# Patient Record
Sex: FEMALE | Race: OTHER | NOT HISPANIC OR LATINO | URBAN - METROPOLITAN AREA
[De-identification: names, ages, dates, MRNs, and addresses within clinical notes are randomized per-mention and may not be internally consistent; named-entity substitution may affect disease eponyms.]

---

## 2020-01-10 ENCOUNTER — EMERGENCY (EMERGENCY)
Facility: HOSPITAL | Age: 35
LOS: 1 days | Discharge: HOME | End: 2020-01-10
Attending: EMERGENCY MEDICINE | Admitting: EMERGENCY MEDICINE
Payer: MEDICAID

## 2020-01-10 VITALS
SYSTOLIC BLOOD PRESSURE: 154 MMHG | TEMPERATURE: 99 F | RESPIRATION RATE: 16 BRPM | HEART RATE: 117 BPM | DIASTOLIC BLOOD PRESSURE: 83 MMHG | OXYGEN SATURATION: 100 %

## 2020-01-10 VITALS — OXYGEN SATURATION: 100 % | HEART RATE: 95 BPM | RESPIRATION RATE: 17 BRPM

## 2020-01-10 LAB
ALBUMIN SERPL ELPH-MCNC: 4.8 G/DL — SIGNIFICANT CHANGE UP (ref 3.3–5)
ALP SERPL-CCNC: 65 U/L — SIGNIFICANT CHANGE UP (ref 40–120)
ALT FLD-CCNC: 20 U/L — SIGNIFICANT CHANGE UP (ref 4–33)
ANION GAP SERPL CALC-SCNC: 14 MMO/L — SIGNIFICANT CHANGE UP (ref 7–14)
AST SERPL-CCNC: 37 U/L — HIGH (ref 4–32)
BASOPHILS # BLD AUTO: 0.04 K/UL — SIGNIFICANT CHANGE UP (ref 0–0.2)
BASOPHILS NFR BLD AUTO: 0.4 % — SIGNIFICANT CHANGE UP (ref 0–2)
BILIRUB SERPL-MCNC: 0.4 MG/DL — SIGNIFICANT CHANGE UP (ref 0.2–1.2)
BUN SERPL-MCNC: 11 MG/DL — SIGNIFICANT CHANGE UP (ref 7–23)
CALCIUM SERPL-MCNC: 9.5 MG/DL — SIGNIFICANT CHANGE UP (ref 8.4–10.5)
CHLORIDE SERPL-SCNC: 102 MMOL/L — SIGNIFICANT CHANGE UP (ref 98–107)
CO2 SERPL-SCNC: 18 MMOL/L — LOW (ref 22–31)
CREAT SERPL-MCNC: 0.71 MG/DL — SIGNIFICANT CHANGE UP (ref 0.5–1.3)
EOSINOPHIL # BLD AUTO: 0.08 K/UL — SIGNIFICANT CHANGE UP (ref 0–0.5)
EOSINOPHIL NFR BLD AUTO: 0.9 % — SIGNIFICANT CHANGE UP (ref 0–6)
GLUCOSE SERPL-MCNC: 107 MG/DL — HIGH (ref 70–99)
HCT VFR BLD CALC: 43.8 % — SIGNIFICANT CHANGE UP (ref 34.5–45)
HGB BLD-MCNC: 14.5 G/DL — SIGNIFICANT CHANGE UP (ref 11.5–15.5)
IMM GRANULOCYTES NFR BLD AUTO: 0.2 % — SIGNIFICANT CHANGE UP (ref 0–1.5)
LYMPHOCYTES # BLD AUTO: 2.52 K/UL — SIGNIFICANT CHANGE UP (ref 1–3.3)
LYMPHOCYTES # BLD AUTO: 28.1 % — SIGNIFICANT CHANGE UP (ref 13–44)
MCHC RBC-ENTMCNC: 28.3 PG — SIGNIFICANT CHANGE UP (ref 27–34)
MCHC RBC-ENTMCNC: 33.1 % — SIGNIFICANT CHANGE UP (ref 32–36)
MCV RBC AUTO: 85.4 FL — SIGNIFICANT CHANGE UP (ref 80–100)
MONOCYTES # BLD AUTO: 0.4 K/UL — SIGNIFICANT CHANGE UP (ref 0–0.9)
MONOCYTES NFR BLD AUTO: 4.5 % — SIGNIFICANT CHANGE UP (ref 2–14)
NEUTROPHILS # BLD AUTO: 5.91 K/UL — SIGNIFICANT CHANGE UP (ref 1.8–7.4)
NEUTROPHILS NFR BLD AUTO: 65.9 % — SIGNIFICANT CHANGE UP (ref 43–77)
NRBC # FLD: 0 K/UL — SIGNIFICANT CHANGE UP (ref 0–0)
PLATELET # BLD AUTO: 297 K/UL — SIGNIFICANT CHANGE UP (ref 150–400)
PMV BLD: 9.2 FL — SIGNIFICANT CHANGE UP (ref 7–13)
POTASSIUM SERPL-MCNC: 5 MMOL/L — SIGNIFICANT CHANGE UP (ref 3.5–5.3)
POTASSIUM SERPL-SCNC: 5 MMOL/L — SIGNIFICANT CHANGE UP (ref 3.5–5.3)
PROT SERPL-MCNC: 7.8 G/DL — SIGNIFICANT CHANGE UP (ref 6–8.3)
RBC # BLD: 5.13 M/UL — SIGNIFICANT CHANGE UP (ref 3.8–5.2)
RBC # FLD: 12.1 % — SIGNIFICANT CHANGE UP (ref 10.3–14.5)
SODIUM SERPL-SCNC: 134 MMOL/L — LOW (ref 135–145)
WBC # BLD: 8.97 K/UL — SIGNIFICANT CHANGE UP (ref 3.8–10.5)
WBC # FLD AUTO: 8.97 K/UL — SIGNIFICANT CHANGE UP (ref 3.8–10.5)

## 2020-01-10 PROCEDURE — 70496 CT ANGIOGRAPHY HEAD: CPT | Mod: 26

## 2020-01-10 PROCEDURE — 93010 ELECTROCARDIOGRAM REPORT: CPT

## 2020-01-10 PROCEDURE — 70498 CT ANGIOGRAPHY NECK: CPT | Mod: 26

## 2020-01-10 PROCEDURE — 99285 EMERGENCY DEPT VISIT HI MDM: CPT | Mod: 25

## 2020-01-10 NOTE — ED ADULT NURSE NOTE - OBJECTIVE STATEMENT
Intake RN: Patient is a 35 y/o F a&ox4, denies PMH, p/w a c/c of acute onset blurry vision in one eye that lasted approximately one minute, followed by an transient episode of dizziness.  At present patient asymptomatic, denying complaints.  Patient denies SOB, CP, N/V/D, F/C, abd pain, GI/ symptoms.  Patient in nad, waiting to be seen by MD.

## 2020-01-10 NOTE — CONSULT NOTE ADULT - ASSESSMENT
Assessment: 34 year old RH F with no significant PMH presents after a one minute episode of cloudy vision in the left eye on 1/10. Vital signs significant for a BP of 154/83 and a HR of 117. Physical exam showed no focal neurologic deficits, but tachycardia.     Impression: DELANEY possible, floaters possible, ocular migraine possible. Considering monocular symptoms, neurologic etiology appears less likely    Plan:  Consider obtaining CTA H/N with contrast. If no acute findings, then no neurologic contraindication to discharge  Neurology follow up outpatient ([address] [place] [address], [phone])    Case discussed with neurology attending, Dr. Gaston. Assessment: 34 year old RH F with no significant PMH presents after a one minute episode of cloudy vision in the left eye on 1/10. Vital signs significant for a BP of 154/83 and a HR of 117. Physical exam showed no focal neurologic deficits, but tachycardia.     Impression: DELANEY possible, floaters possible, ocular migraine possible. Considering monocular symptoms, neurologic etiology appears less likely    Plan:  Consider obtaining CTA H/N with contrast. If no acute findings, then no neurologic contraindication to discharge  Workup of tachycardia/HTN as per primary team.  Neurology follow up outpatient ([address] [place] [address], [phone])    Case discussed with neurology attending, Dr. Gaston.

## 2020-01-10 NOTE — ED PROVIDER NOTE - PROGRESS NOTE DETAILS
spoke with optho resident who will see pt in the ED FELIPE Srinivasan: seen by optho, normal eye exam, neuro evaluation pt at present FELIPE Srinivasan: seen by neuro, agree with CTa head and neck FELIPE Srinivasan: CTa head and neck normal, discussed with pt. She will f/u neuro and optho outpt

## 2020-01-10 NOTE — ED PROVIDER NOTE - CLINICAL SUMMARY MEDICAL DECISION MAKING FREE TEXT BOX
34yF w/no pmhx p/w left eye visual changes (blurry vision) x 1-2 minutes this morning, now resolved with associated lightheadedness. non focal neuro exam. Vision 20/20. Will consult optho for possible retinal detachment. 34yF w/no pmhx p/w left eye visual changes (blurry vision) x 1-2 minutes this morning, now resolved with associated lightheadedness. non focal neuro exam. Vision 20/20. Will consult optho for possible retinal detachment/retinal artery/vein pathology. Will discuss with neuro as history concerning for TIA. Will check cbc/cmp, ucg, CTa head and neck. Triage vitals with tachycardia, pt reports feeling very anxious. Will continue to reassess, no visual disturbance or symptoms at present.

## 2020-01-10 NOTE — ED PROVIDER NOTE - PATIENT PORTAL LINK FT
You can access the FollowMyHealth Patient Portal offered by [facility] by registering at the following website: [email or web address]. By joining [employer]’s FollowMyHealth portal, you will also be able to view your health information using other applications (apps) compatible with our system.

## 2020-01-10 NOTE — ED PROVIDER NOTE - ATTENDING CONTRIBUTION TO CARE
MD Davila:  I performed a face to face bedside interview with patient regarding history of present illness, review of symptoms and past medical history. I completed an independent physical exam(documented below).  I have discussed patient's plan of care with PA.   I agree with note as stated above, having amended the EMR as needed to reflect my findings. I have discussed the assessment and plan of care.  This includes during the time I functioned as the attending physician for this patient.  PE:  Gen: Alert, anxious-appearing  Head: NC, AT,  EOMI, normal lids/conjunctiva  ENT:  normal hearing, patent oropharynx without erythema/exudate  Neck: +supple, no tenderness/meningismus/JVD, +Trachea midline  Chest: no chest wall tenderness, equal chest rise  Pulm: Bilateral BS, normal resp effort, no wheeze/stridor/retractions  CV: RRR, no M/R/G, +dist pulses  Abd: +BS, soft, NT/ND  Rectal: deferred  Mskel: no edema/erythema/cyanosis  Skin: no rash  Neuro: AAOx3, no sensory/motor deficits, CN 2-12 intact, negative rhomberg, normal gait  MDM:   33yo F, denies significant pmh, c/o transient visual field defect (blurred L upper quadrant of vision only from Left eye) associated w/ lightheadedness and headache. Ddx includes TIA/CVA vs complex migraine vs primary ocular issue (vitreous hemorrhage vs detachment). Labs, CT, optho consult.

## 2020-01-10 NOTE — CONSULT NOTE ADULT - ATTENDING COMMENTS
Case was discussed with me but patient was not examined.  Agree with above assessment and plan. If imaging is negative would follow-up as outpatient.

## 2020-01-10 NOTE — CONSULT NOTE ADULT - SUBJECTIVE AND OBJECTIVE BOX
[facility] Ophthalmology Consult Note    HPI: 34-year-old F with no PMHx and no POChx who presents with episode of left eye floaters in vision for 1-2 minutes and vision returned back to baseline. Not associated with any pain, no flashes, no double vision, no pain with EOM. Never had before. No curtain across vision. No numbness/weakness/tingling.    PMHx: None  Meds: None  POcHx (including surgeries/lasers/trauma):  None  Drops: None  FamHx: None  Social Hx: None  Allergies: NKDA    ROS:  General (neg), Vision (per HPI), Head and Neck (neg), Pulm (neg), CV (neg), GI (neg),  (neg), Musculoskeletal (neg), Skin/Integ (neg), Neuro (neg), Endocrine (neg), Heme (neg), All/Immuno (neg)    Mood and Affect Appropriate ( x ),  Oriented to Time, Place, and Person x 3 ( x )    Ophthalmology Exam    Visual acuity (sc near card): 20/20 OU  Pupils: PERRL OU, no APD  Ttono: 12 OU  Extraocular movements (EOMs): Full OU, no pain, no diplopia   Confrontational Visual Field (CVF):  Full OU  Color Plates: 12/12 OU    Pen Light Exam (PLE)  External:  Flat OU  Lids/Lashes/Lacrimal Ducts: Flat OU    Sclera/Conjunctiva:  W+Q OU  Cornea: trace SPK OU  Anterior Chamber: D+F OU  Iris:  Flat OU  Lens:  Clear OU    Fundus Exam: dilated with 1% tropicamide and 2.5% phenylephrine  Approval obtained from primary team for dilation  Patient aware that pupils can remained dilated for at least 4-6 hours  Exam performed with 20D lens    Vitreous: wnl OU  Disc, cup/disc: sharp and pink, 0.4 OU  Macula:  wnl OU  Vessels:  wnl OU  Periphery: wnl OU      Assessment:  34-year-old F with no PMHx and no POChx who presents with episode of left eye floaters in vision for 1-2 minutes and vision returned back to baseline. VA 20/20, no APD, IOP WNL, CVF and color full, EOM full. Anterior exam WNL, trace corneal dryness. DFE wnl OU, nerves sharp and pink, macula flat, no evidence of tear/break/RD.    Plan:  - no acute ophthalmologic intervention  - can start preservative free artificial tears 1 drop BID  - RD precautions given to patient  - rest of management per primary team  - plan discussed with patient and primary team    Follow-Up:  Patient should follow up with her ophthalmologist or in the [facility] Ophthalmology Practice within 1 week of discharge (card given)  [address] [facility].  [place], [place] [id number] [phone] [facility] Ophthalmology Consult Note    HPI: 34-year-old F with no PMHx and no POChx who presents with episode of left eye floaters in vision for 1-2 minutes and vision returned back to baseline. Not associated with any pain, no flashes, no double vision, no pain with EOM. Never had before. No curtain across vision. No numbness/weakness/tingling.    PMHx: None  Meds: None  POcHx (including surgeries/lasers/trauma):  None  Drops: None  FamHx: None  Social Hx: None  Allergies: NKDA    ROS:  General (neg), Vision (per HPI), Head and Neck (neg), Pulm (neg), CV (neg), GI (neg),  (neg), Musculoskeletal (neg), Skin/Integ (neg), Neuro (neg), Endocrine (neg), Heme (neg), All/Immuno (neg)    Mood and Affect Appropriate ( x ),  Oriented to Time, Place, and Person x 3 ( x )    Ophthalmology Exam    Visual acuity (sc near card): 20/20 OU  Pupils: PERRL OU, no APD  Ttono: 12 OU  Extraocular movements (EOMs): Full OU, no pain, no diplopia   Confrontational Visual Field (CVF):  Full OU  Color Plates: 12/12 OU    Pen Light Exam (PLE)  External:  Flat OU  Lids/Lashes/Lacrimal Ducts: Flat OU    Sclera/Conjunctiva:  W+Q OU  Cornea: trace SPK OU  Anterior Chamber: D+F OU  Iris:  Flat OU  Lens:  Clear OU    Fundus Exam: dilated with 1% tropicamide and 2.5% phenylephrine  Approval obtained from primary team for dilation  Patient aware that pupils can remained dilated for at least 4-6 hours  Exam performed with 20D lens    Vitreous: wnl OU  Disc, cup/disc: sharp and pink, 0.4 OU  Macula:  wnl OU  Vessels:  wnl OU  Periphery: wnl OU      Assessment:  34-year-old F with no PMHx and no POChx who presents with episode of left eye floaters in vision for 1-2 minutes and vision returned back to baseline. VA 20/20, no APD, IOP WNL, CVF and color full, EOM full. Anterior exam WNL, trace corneal dryness. DFE wnl OU, nerves sharp and pink, macula flat, no evidence of tear/break/RD.    Plan:  - no acute ophthalmologic intervention  - can start preservative free artificial tears 1 drop BID  - RD precautions given to patient  - rest of management per primary team  - plan discussed with patient and primary team    Follow-Up:  Patient should follow up with her ophthalmologist or in the [facility] Ophthalmology Practice within 1 week of discharge for redilation and scleral depression (card given)  [address] [place] [address]  [phone]    D/w Dr. Castellanos (attending)

## 2020-01-10 NOTE — ED ADULT TRIAGE NOTE - CHIEF COMPLAINT QUOTE
A&OX3 c/o dizziness pt states she was walking down stairs when she missed bottom step states she did not fall or hit head, reports headache, blurry vision, denies numbness tingling cp sob

## 2020-01-10 NOTE — ED PROVIDER NOTE - NSFOLLOWUPINSTRUCTIONS_ED_ALL_ED_FT
Follow up with your primary care provider within 1 week  Follow up with Neurology within 1-2 weeks, call [phone] to make an appointment ([address] [place] [address]) Follow up with your primary care provider within 1 week  Follow up with Neurology within 1-2 weeks, call [phone] to make an appointment ([address] [facility])  Follow up with your ophthalmologist within 1 week or in the [facility] Ophthalmology Practice within 1 week of discharge (card given) [address] [place] [address], [phone] call to make an appointment  Return to the ER with any worsening or concerning symptoms, visual changes, headache, dizziness or any other concerns.

## 2020-01-10 NOTE — ED ADULT NURSE NOTE - NSIMPLEMENTINTERV_GEN_ALL_ED
Implemented All Universal Safety Interventions:  [place] to call system. Call bell, personal items and telephone within reach. Instruct patient to call for assistance. Room bathroom lighting operational. Non-slip footwear when patient is off stretcher. Physically safe environment: no spills, clutter or unnecessary equipment. Stretcher in lowest position, wheels locked, appropriate side rails in place.

## 2020-01-10 NOTE — ED PROVIDER NOTE - OBJECTIVE STATEMENT
34yF w/no pmhx p/w left eye visual disturbance this morning. Pt states she was doing laundry when she suddenly noticed left eye outer and lower visual field blurry vision with possible floaters. Pt states she then developed lightheadedness and felt anxious. She called out for her mother and ran down the steps (missing the last step but did not fall). Pt reports these symptoms resolved within 1 minute. She states last night she had a headache, felt like her typical headache. Pt denies eye pain, headache today, dizziness, weakness, numbness, tingling, cp, sob, palpitations, abd pain n/v/d or any other concerns.

## 2020-01-10 NOTE — CONSULT NOTE ADULT - SUBJECTIVE AND OBJECTIVE BOX
HPI: 34 year old RH F with no significant PMH presents after a one minute episode of cloudy vision in the left eye on 1/10. She was doing her laundry when she suddenly noticed a blurry crescent in the left eye. She noticed that her left eye was affected when she covered her right eye. On attempting to reach for the door knob, she missed due to vision deficit. She alfred a picture in which the clouding of the vision was depicted as a crescent shape that took up less than 50% of the left eye's temporal hemifield. She had a bitemporal headache during the night of 1/9 which resolved with ibuprofen. The headache was not associated with photophobia, phonophobia, nausea, or vomiting. She has no history of stroke, but endorses a headache history. Her headaches are usually bitemporal and alleviated with analgesics. She has no history of significant ocular problems. She denies any other neurologic symptoms, such as weakness, tingling, numbness, dizziness, or headache associated with the episode. She denies photopsias or floaters. She has never had a similar episode. She states that she is not usually an anxious person, but is feeling quite anxious today. She follows with a PCP for annual appointments.      NIHSS: 0  MRS: 0    REVIEW OF SYSTEMS    A 10-system ROS was performed and is negative except for those items noted above and/or in the HPI.    PAST MEDICAL & SURGICAL HISTORY:  No pertinent past medical history  No significant past surgical history    FAMILY HISTORY:    SOCIAL HISTORY:   T/E/D: denies  Lives with: family    MEDICATIONS (HOME):  Home Medications:    MEDICATIONS  (STANDING):    MEDICATIONS  (PRN):    ALLERGIES/INTOLERANCES:  Allergies  No Known Allergies    Intolerances    VITALS & EXAMINATION:  Vital Signs Last 24 Hrs  T(C): 36.9 (10 Jevon 2020 13:03), Max: 37.1 (10 Jevon 2020 09:45)  T(F): 98.4 (10 Jevon 2020 13:03), Max: 98.7 (10 Jevon 2020 09:45)  HR: 108 (10 Jevon 2020 13:03) (108 - 122)  BP: 160/78 (10 Jevon 2020 13:03) (131/84 - 160/78)  BP(mean): --  RR: 16 (10 Jevon 2020 13:03) (16 - 16)  SpO2: 100% (10 Jevon 2020 13:03) (100% - 100%)    General: Female, appears stated age, in no apparent distress including pain, anxious appearing    Cardiovascular (>2): tachycardic, no murmurs. Carotid pulsations symmetric, no bruits.     Neurological (>12):  MS: Awake, alert, oriented to person, place, situation, time. Normal affect. Follows all commands.    Language: Speech is clear, fluent with good repetition & comprehension (able to name objects pen, finger, button)    CNs: unable to evaluate pupillary light reflex as eyes were pharmacologically dilated. VFF. EOMI no nystagmus. V1-3 intact to LT. No facial asymmetry b/l. Hearing grossly normal (rubbing fingers) b/l. Symmetric palate elevation in midline. Gag reflex deferred. Head turning & shoulder shrug intact b/l. Tongue midline, normal movements, no atrophy.    Fundoscopic: pale w/ sharp discs margins No vascular changes.      Motor: Normal muscle bulk & tone. No noticeable tremor or seizure. No pronator drift.              Deltoid	Biceps	Triceps	Wrist	Finger ABd	   R	5	5	5	5	5		5 	  L	5	5	5	5	5		5    	H-Flex	H-Ext			K-Flex	K-Ext	D-Flex	P-Flex  R	5	5			5	5	5	5 	   L	5	5			5	5	5	5	     Sensation: Intact to LT b/l throughout.     Cortical: Extinction on DSS (neglect): none    Reflexes:              Biceps(C5)       BR(C6)     Triceps(C7)               Patellar(L4)    Achilles(S1)    Plantar Resp  R	2	          2	             2		        3		    2		mute  L	2	          2	             2		        3		    2		mute     No ankle clonus b/l    Coordination: No dysmetria to FTN/HTS    Gait: Normal Romberg. No postural instability. Normal stance and tandem gait.     LABORATORY:  CBC                       14.5   8.97  )-----------( 297      ( 10 Jevon 2020 11:20 )             43.8     Chem 01-10    134<L>  |  102  |  11  ----------------------------<  107<H>  5.0   |  18<L>  |  0.71    Ca    9.5      10 Jevon 2020 11:20    TPro  7.8  /  Alb  4.8  /  TBili  0.4  /  DBili  x   /  AST  37<H>  /  ALT  20  /  AlkPhos  65  01-10    LFTs LIVER FUNCTIONS - ( 10 Jevon 2020 11:20 )  Alb: 4.8 g/dL / Pro: 7.8 g/dL / ALK PHOS: 65 u/L / ALT: 20 u/L / AST: 37 u/L / GGT: x           Coagulopathy   Lipid Panel   A1c   Cardiac enzymes     U/A   CSF  Immunological  Other    STUDIES & IMAGING:  Studies (EKG, EEG, EMG, etc):   < from: 12 Lead ECG (01.10.20 @ 09:54) >  Ventricular Rate 112 BPM    Atrial Rate 112 BPM    P-R Interval 180 ms    QRS Duration 74 ms    Q-T Interval 318 ms    QTC Calculation(Bezet) 434 ms    P Axis 74 degrees    R Axis 68 degrees    T Axis 44 degrees    Diagnosis Line Sinus tachycardia  Possible Left atrial enlargement  Borderline ECG    < end of copied text >    Radiology (XR, CT, MR, U/S, TTE/ITALO):

## 2025-05-04 ENCOUNTER — EMERGENCY (EMERGENCY)
Facility: HOSPITAL | Age: 40
LOS: 1 days | End: 2025-05-04
Attending: EMERGENCY MEDICINE
Payer: COMMERCIAL

## 2025-05-04 VITALS
SYSTOLIC BLOOD PRESSURE: 130 MMHG | TEMPERATURE: 99 F | RESPIRATION RATE: 15 BRPM | WEIGHT: 139.99 LBS | OXYGEN SATURATION: 99 % | HEART RATE: 118 BPM | DIASTOLIC BLOOD PRESSURE: 87 MMHG

## 2025-05-04 VITALS
HEART RATE: 93 BPM | DIASTOLIC BLOOD PRESSURE: 72 MMHG | RESPIRATION RATE: 17 BRPM | OXYGEN SATURATION: 99 % | TEMPERATURE: 98 F | SYSTOLIC BLOOD PRESSURE: 125 MMHG

## 2025-05-04 LAB
ALBUMIN SERPL ELPH-MCNC: 4.7 G/DL — SIGNIFICANT CHANGE UP (ref 3.3–5)
ALP SERPL-CCNC: 62 U/L — SIGNIFICANT CHANGE UP (ref 40–120)
ALT FLD-CCNC: 18 U/L — SIGNIFICANT CHANGE UP (ref 10–45)
ANION GAP SERPL CALC-SCNC: 16 MMOL/L — SIGNIFICANT CHANGE UP (ref 5–17)
AST SERPL-CCNC: 49 U/L — HIGH (ref 10–40)
BASOPHILS # BLD AUTO: 0.04 K/UL — SIGNIFICANT CHANGE UP (ref 0–0.2)
BASOPHILS NFR BLD AUTO: 0.6 % — SIGNIFICANT CHANGE UP (ref 0–2)
BILIRUB SERPL-MCNC: 0.3 MG/DL — SIGNIFICANT CHANGE UP (ref 0.2–1.2)
BUN SERPL-MCNC: 10 MG/DL — SIGNIFICANT CHANGE UP (ref 7–23)
CALCIUM SERPL-MCNC: 9.7 MG/DL — SIGNIFICANT CHANGE UP (ref 8.4–10.5)
CHLORIDE SERPL-SCNC: 102 MMOL/L — SIGNIFICANT CHANGE UP (ref 96–108)
CO2 SERPL-SCNC: 19 MMOL/L — LOW (ref 22–31)
CREAT SERPL-MCNC: 0.82 MG/DL — SIGNIFICANT CHANGE UP (ref 0.5–1.3)
EGFR: 93 ML/MIN/1.73M2 — SIGNIFICANT CHANGE UP
EGFR: 93 ML/MIN/1.73M2 — SIGNIFICANT CHANGE UP
EOSINOPHIL # BLD AUTO: 0.02 K/UL — SIGNIFICANT CHANGE UP (ref 0–0.5)
EOSINOPHIL NFR BLD AUTO: 0.3 % — SIGNIFICANT CHANGE UP (ref 0–6)
GAS PNL BLDV: SIGNIFICANT CHANGE UP
GLUCOSE SERPL-MCNC: 103 MG/DL — HIGH (ref 70–99)
HCG SERPL-ACNC: <2 MIU/ML — SIGNIFICANT CHANGE UP
HCT VFR BLD CALC: 39 % — SIGNIFICANT CHANGE UP (ref 34.5–45)
HGB BLD-MCNC: 12.3 G/DL — SIGNIFICANT CHANGE UP (ref 11.5–15.5)
IMM GRANULOCYTES NFR BLD AUTO: 0.3 % — SIGNIFICANT CHANGE UP (ref 0–0.9)
LYMPHOCYTES # BLD AUTO: 2.26 K/UL — SIGNIFICANT CHANGE UP (ref 1–3.3)
LYMPHOCYTES # BLD AUTO: 33.7 % — SIGNIFICANT CHANGE UP (ref 13–44)
MAGNESIUM SERPL-MCNC: 2.3 MG/DL — SIGNIFICANT CHANGE UP (ref 1.6–2.6)
MCHC RBC-ENTMCNC: 24.7 PG — LOW (ref 27–34)
MCHC RBC-ENTMCNC: 31.5 G/DL — LOW (ref 32–36)
MCV RBC AUTO: 78.5 FL — LOW (ref 80–100)
MONOCYTES # BLD AUTO: 0.41 K/UL — SIGNIFICANT CHANGE UP (ref 0–0.9)
MONOCYTES NFR BLD AUTO: 6.1 % — SIGNIFICANT CHANGE UP (ref 2–14)
NEUTROPHILS # BLD AUTO: 3.95 K/UL — SIGNIFICANT CHANGE UP (ref 1.8–7.4)
NEUTROPHILS NFR BLD AUTO: 59 % — SIGNIFICANT CHANGE UP (ref 43–77)
NRBC BLD AUTO-RTO: 0 /100 WBCS — SIGNIFICANT CHANGE UP (ref 0–0)
PLATELET # BLD AUTO: 357 K/UL — SIGNIFICANT CHANGE UP (ref 150–400)
POTASSIUM SERPL-MCNC: 5.1 MMOL/L — SIGNIFICANT CHANGE UP (ref 3.5–5.3)
POTASSIUM SERPL-SCNC: 5.1 MMOL/L — SIGNIFICANT CHANGE UP (ref 3.5–5.3)
PROT SERPL-MCNC: 8.2 G/DL — SIGNIFICANT CHANGE UP (ref 6–8.3)
RBC # BLD: 4.97 M/UL — SIGNIFICANT CHANGE UP (ref 3.8–5.2)
RBC # FLD: 15.9 % — HIGH (ref 10.3–14.5)
SODIUM SERPL-SCNC: 137 MMOL/L — SIGNIFICANT CHANGE UP (ref 135–145)
TROPONIN T, HIGH SENSITIVITY RESULT: <6 NG/L — SIGNIFICANT CHANGE UP (ref 0–51)
TSH SERPL-MCNC: 1.76 UIU/ML — SIGNIFICANT CHANGE UP (ref 0.27–4.2)
WBC # BLD: 6.7 K/UL — SIGNIFICANT CHANGE UP (ref 3.8–10.5)
WBC # FLD AUTO: 6.7 K/UL — SIGNIFICANT CHANGE UP (ref 3.8–10.5)

## 2025-05-04 PROCEDURE — 85014 HEMATOCRIT: CPT

## 2025-05-04 PROCEDURE — 82803 BLOOD GASES ANY COMBINATION: CPT

## 2025-05-04 PROCEDURE — 84443 ASSAY THYROID STIM HORMONE: CPT

## 2025-05-04 PROCEDURE — 84484 ASSAY OF TROPONIN QUANT: CPT

## 2025-05-04 PROCEDURE — 36000 PLACE NEEDLE IN VEIN: CPT | Mod: XU

## 2025-05-04 PROCEDURE — 85018 HEMOGLOBIN: CPT

## 2025-05-04 PROCEDURE — 82947 ASSAY GLUCOSE BLOOD QUANT: CPT

## 2025-05-04 PROCEDURE — 99285 EMERGENCY DEPT VISIT HI MDM: CPT | Mod: 25

## 2025-05-04 PROCEDURE — 99284 EMERGENCY DEPT VISIT MOD MDM: CPT

## 2025-05-04 PROCEDURE — 82330 ASSAY OF CALCIUM: CPT

## 2025-05-04 PROCEDURE — 84132 ASSAY OF SERUM POTASSIUM: CPT

## 2025-05-04 PROCEDURE — 85025 COMPLETE CBC W/AUTO DIFF WBC: CPT

## 2025-05-04 PROCEDURE — 83605 ASSAY OF LACTIC ACID: CPT

## 2025-05-04 PROCEDURE — 83735 ASSAY OF MAGNESIUM: CPT

## 2025-05-04 PROCEDURE — 93005 ELECTROCARDIOGRAM TRACING: CPT

## 2025-05-04 PROCEDURE — 82435 ASSAY OF BLOOD CHLORIDE: CPT

## 2025-05-04 PROCEDURE — 71046 X-RAY EXAM CHEST 2 VIEWS: CPT | Mod: 26

## 2025-05-04 PROCEDURE — 80053 COMPREHEN METABOLIC PANEL: CPT

## 2025-05-04 PROCEDURE — 84702 CHORIONIC GONADOTROPIN TEST: CPT

## 2025-05-04 PROCEDURE — 84295 ASSAY OF SERUM SODIUM: CPT

## 2025-05-04 PROCEDURE — 71046 X-RAY EXAM CHEST 2 VIEWS: CPT

## 2025-05-04 RX ADMIN — Medication 1000 MILLILITER(S): at 10:53

## 2025-05-04 NOTE — ED PROVIDER NOTE - CLINICAL SUMMARY MEDICAL DECISION MAKING FREE TEXT BOX
39-year-old female with no past medical history presenting for evaluation of tingling and generalized weakness that began suddenly last night.  Endorses feeling quite anxious now as well and was noted to be tachycardic in triage.  She states she was in her usual state of health prior to last night.  Recently had blood work done a couple of weeks ago.  Denies any chest pain, shortness of breath, palpitations at home, abdominal pain, urinary symptoms, vomiting, diarrhea, weight loss, recent long travel, recent surgeries, history of blood clots, history of cancer.  Does have an IUD in place.  Only takes multivitamins at home.  Notes allergy to ibuprofen which she states makes her heart go fast.  Denies alcohol, drug, tobacco use.    Vitals in triage notable for /87, heart rate 118, T98.6, O2 99% on room air    On exam appears slightly anxious but no acute distress, alert.  HEENT exam AT/NC, normal work of breathing on room air, clear lungs.  CV exam with tachycardia to 115 but regular with intermittent premature beats.  No extremity swelling or deformities.  Skin warm and dry.  Abdomen soft, nontender.  Anxious affect.    Assessment/plan  Vague symptoms of tingling and generalized weakness with tachycardia noted in triage.  No PE risk factors.  Will obtain CBC, CMP to evaluate for electrolytes and anemia, TSH for hyperthyroidism, cardiac workup including troponin, chest x-ray, give IV fluids and reassess. 39-year-old female with no past medical history presenting for evaluation of tingling and generalized weakness that began suddenly last night.  Endorses feeling quite anxious now as well and was noted to be tachycardic in triage.  She states she was in her usual state of health prior to last night.  Recently had blood work done a couple of weeks ago.  Denies any chest pain, shortness of breath, palpitations at home, abdominal pain, urinary symptoms, vomiting, diarrhea, weight loss, recent long travel, recent surgeries, history of blood clots, history of cancer.  Does have an IUD in place.  Only takes multivitamins at home.  Notes allergy to ibuprofen which she states makes her heart go fast.  Denies alcohol, drug, tobacco use.    Vitals in triage notable for /87, heart rate 118, T98.6, O2 99% on room air    On exam appears slightly anxious but no acute distress, alert.  HEENT exam AT/NC, normal work of breathing on room air, clear lungs.  CV exam with tachycardia to 115 but regular with intermittent premature beats.  No extremity swelling or deformities.  Skin warm and dry.  Abdomen soft, nontender.  Anxious affect.    Assessment/plan  Vague symptoms of tingling and generalized weakness with tachycardia noted in triage.  No PE risk factors.  Will obtain CBC, CMP to evaluate for electrolytes and anemia, TSH for hyperthyroidism, cardiac workup including troponin, chest x-ray, give IV fluids and reassess.    Magda: 39 year old female with generalized weakness and tingling.  states started to worry about symptoms and became anxious about it. PE: att exam: patient awake alert NAD. LUNGS CTAB no wheeze no crackle. CARDtachycardic,   Abdomen soft NT ND no rebound no guarding no CVA tenderness. EXT  no edema no calf tenderness CV 2+DP/PT bilaterally. neuro A&Ox3.  plan: will get labs, cxr, ekg, cardiac enzymes. r/o electolyte abnormality. r/o infectious cause, r/o cardiac cause. patient well appearing, no prior episdoes. likely tachycardidc due to anxiety, no travel, no leg swelling not on ocp, will reassess

## 2025-05-04 NOTE — ED ADULT NURSE NOTE - OBJECTIVE STATEMENT
40 yo F presents to ED c/o weakness. Pt states last night she started to feel weak "out of nowhere". Pt also reports tingling sensation in upper and lower extremities. Pt denies ha, cp, sob, lightheadedness, numbness, urinary/bowel symptoms. Pt A&Ox4, johnson and following commands. Safety measures in place, comfort provided. 40 yo F presents to ED c/o weakness. Pt states last night she started to feel weak "out of nowhere". Pt also reports tingling sensation in upper and lower extremities. Pt endorsing feelings of anxiety, "I didn't know what to do". Pt denies ha, cp, sob, lightheadedness, numbness, urinary/bowel symptoms. Pt A&Ox4, johnson and following commands, tolerates ambulation without assistance. Safety measures in place, comfort provided.

## 2025-05-04 NOTE — ED ADULT NURSE NOTE - NSFALLUNIVINTERV_ED_ALL_ED
Bed/Stretcher in lowest position, wheels locked, appropriate side rails in place/Call bell, personal items and telephone in reach/Instruct patient to call for assistance before getting out of bed/chair/stretcher/Non-slip footwear applied when patient is off stretcher/[place] to call system/Physically safe environment - no spills, clutter or unnecessary equipment/Purposeful proactive rounding/Room/bathroom lighting operational, light cord in reach

## 2025-05-04 NOTE — ED PROVIDER NOTE - PATIENT PORTAL LINK FT
You can access the FollowMyHealth Patient Portal offered by [facility] by registering at the following website: [email or web address]. By joining [employer]’s FollowMyHealth portal, you will also be able to view your health information using other applications (apps) compatible with our system.

## 2025-05-04 NOTE — ED PROVIDER NOTE - NSFOLLOWUPINSTRUCTIONS_ED_ALL_ED_FT
You were seen for numbness and tingling as well as generalized weakness.    Your heart rate was noted to be elevated.  We checked your blood work which was reassuring.  Your chest x-ray was clear.  Heart rate improved after some fluids    We recommend following up with your doctor in 2 to 3 days.    Stay hydrated and eat as you normally would.  Continue taking your medications as prescribed.      Return to the emergency department for the following symptoms: Chest pain, palpitations, you pass out, confusion, fast heart rate, abdominal pain, bloody stools or dark tarry stools, persistent fevers.

## 2025-05-04 NOTE — ED ADULT NURSE REASSESSMENT NOTE - NS ED NURSE REASSESS COMMENT FT1
Pt reports symptoms have fully improved s/p fluid administration, comfort measures provided, pending dispo.

## 2025-05-04 NOTE — ED PROVIDER NOTE - PROGRESS NOTE DETAILS
Cody Sanon MD PGY-1: labs reassuring, EKG sinus tachycardia with no arrythmia or ST changes, HR improved after fluids. Okay for DC home.

## 2025-05-08 PROBLEM — Z78.9 OTHER SPECIFIED HEALTH STATUS: Chronic | Status: ACTIVE | Noted: 2020-01-10
